# Patient Record
Sex: FEMALE | URBAN - METROPOLITAN AREA
[De-identification: names, ages, dates, MRNs, and addresses within clinical notes are randomized per-mention and may not be internally consistent; named-entity substitution may affect disease eponyms.]

---

## 2020-10-19 ENCOUNTER — OFFICE VISIT (OUTPATIENT)
Dept: URBAN - METROPOLITAN AREA CLINIC 86 | Facility: CLINIC | Age: 35
End: 2020-10-19
Payer: COMMERCIAL

## 2020-10-19 PROCEDURE — 99204 OFFICE O/P NEW MOD 45 MIN: CPT | Performed by: OPHTHALMOLOGY

## 2020-10-19 PROCEDURE — 92134 CPTRZ OPH DX IMG PST SGM RTA: CPT | Performed by: OPHTHALMOLOGY

## 2020-10-19 RX ORDER — AZITHROMYCIN MONOHYDRATE 250 MG/1
250 MG TABLET, FILM COATED ORAL
Qty: 30 | Refills: 0 | Status: INACTIVE
Start: 2020-10-19 | End: 2020-10-23

## 2020-10-19 ASSESSMENT — INTRAOCULAR PRESSURE
OS: 19
OD: 18

## 2020-10-19 NOTE — IMPRESSION/PLAN
Impression: Neuroretinitis OS
-started about 6 weeks ago. Pt noticed high fevers preceding vision loss. Was hospitalized and treated for presumed pneumonia with ABX. Pt seen by ophthalmologist after hospitalization. Diagnosed with disc edema. Was sent back to hospital to obtain IV steroid treatment. Currently she is not on any treatment. Vision has somewhat improved since onset.
-has pet cats
-? history of TB- untreated
-disc edema has resolved OCT:
OD: WNL
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 Given cat exposure, I am concerned for Bartonella related disease. Also on differential includes TB given remote history. Lyme, Syphilis, Sarcoid negative. Had MRI and LP. No evidence of MS per pt. Plan: Start Azithromycin 250mg q day Check Bartonella, TB

RTC 2 weeks- if TB negative will start oral Prednisone.

## 2020-11-02 ENCOUNTER — OFFICE VISIT (OUTPATIENT)
Dept: URBAN - METROPOLITAN AREA CLINIC 86 | Facility: CLINIC | Age: 35
End: 2020-11-02
Payer: COMMERCIAL

## 2020-11-02 PROCEDURE — 99213 OFFICE O/P EST LOW 20 MIN: CPT | Performed by: OPHTHALMOLOGY

## 2020-11-02 PROCEDURE — 92134 CPTRZ OPH DX IMG PST SGM RTA: CPT | Performed by: OPHTHALMOLOGY

## 2020-11-02 RX ORDER — PREDNISONE 20 MG/1
20 MG TABLET ORAL
Qty: 90 | Refills: 1 | Status: INACTIVE
Start: 2020-11-02 | End: 2020-12-01

## 2020-11-02 RX ORDER — AZITHROMYCIN MONOHYDRATE 250 MG/1
250 MG TABLET, FILM COATED ORAL
Qty: 30 | Refills: 1 | Status: INACTIVE
Start: 2020-11-02 | End: 2020-12-01

## 2020-11-02 ASSESSMENT — INTRAOCULAR PRESSURE
OS: 17
OD: 16

## 2020-11-02 NOTE — IMPRESSION/PLAN
Impression: Neuroretinitis OS
-started about 6 weeks ago. Pt noticed high fevers preceding vision loss. Was hospitalized and treated for presumed pneumonia with ABX. Pt seen by ophthalmologist after hospitalization. Diagnosed with disc edema. Was sent back to hospital to obtain IV steroid treatment. Currently she is not on any treatment. Vision has somewhat improved since onset.
-has pet cats
-? history of TB- untreated
-disc edema has resolved OCT:
OD: WNL
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 Given cat exposure, I am concerned for Bartonella related disease. Also on differential includes TB given remote history. Lyme, Syphilis, Sarcoid negative. Had MRI and LP. No evidence of MS per pt. Plan: Start Azithromycin 250mg q day Check Bartonella;TB was negative Start Prednisone 60mg q day RTC 2 weeks DFE OU OCT OU

## 2020-11-16 ENCOUNTER — OFFICE VISIT (OUTPATIENT)
Dept: URBAN - METROPOLITAN AREA CLINIC 86 | Facility: CLINIC | Age: 35
End: 2020-11-16
Payer: COMMERCIAL

## 2020-11-16 DIAGNOSIS — H30.92 UNSPECIFIED CHORIORETINAL INFLAMMATION, LEFT EYE: Primary | ICD-10-CM

## 2020-11-16 PROCEDURE — 92134 CPTRZ OPH DX IMG PST SGM RTA: CPT | Performed by: OPHTHALMOLOGY

## 2020-11-16 PROCEDURE — 92014 COMPRE OPH EXAM EST PT 1/>: CPT | Performed by: OPHTHALMOLOGY

## 2020-11-16 ASSESSMENT — INTRAOCULAR PRESSURE
OD: 16
OS: 16

## 2020-11-16 NOTE — IMPRESSION/PLAN
Impression: Neuroretinitis OS
-started about 6 weeks ago. Pt noticed high fevers preceding vision loss. Was hospitalized and treated for presumed pneumonia with ABX. Pt seen by ophthalmologist after hospitalization. Diagnosed with disc edema. Was sent back to hospital to obtain IV steroid treatment. Currently she is not on any treatment. Vision has somewhat improved since onset.
-has pet cats
-? history of TB- untreated
-disc edema has resolved OCT:
OD: WNL
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 Given cat exposure, I am concerned for Bartonella related disease. Also on differential includes TB given remote history. Lyme, Syphilis, Sarcoid negative. Had MRI and LP. No evidence of MS per pt. Plan: Continue Azithromycin 250mg q day Taper Prednisone 40mg x 5 days 20mg x 5 days 10mg x 5 days then stop Follow up Bartonella titers RTC 4 weeks DFE OU OCT OU

## 2020-12-14 ENCOUNTER — OFFICE VISIT (OUTPATIENT)
Dept: URBAN - METROPOLITAN AREA CLINIC 86 | Facility: CLINIC | Age: 35
End: 2020-12-14
Payer: COMMERCIAL

## 2020-12-14 PROCEDURE — 92134 CPTRZ OPH DX IMG PST SGM RTA: CPT | Performed by: OPHTHALMOLOGY

## 2020-12-14 PROCEDURE — 92014 COMPRE OPH EXAM EST PT 1/>: CPT | Performed by: OPHTHALMOLOGY

## 2020-12-14 ASSESSMENT — INTRAOCULAR PRESSURE
OS: 18
OD: 19

## 2020-12-14 NOTE — IMPRESSION/PLAN
Impression: Neuroretinitis OS Pt noticed high fevers preceding vision loss. Was hospitalized and treated for presumed pneumonia with ABX. Pt seen by ophthalmologist after hospitalization. Diagnosed with disc edema. Was sent back to hospital to obtain IV steroid treatment. Currently she is not on any treatment. Vision has somewhat improved since onset.
-has pet cats
-? history of TB- untreated
-disc edema has resolved OCT:
OD: WNL
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 Given cat exposure, I am concerned for Bartonella related disease. Also on differential includes TB given remote history. Lyme, Syphilis, Sarcoid negative. Had MRI and LP. No evidence of MS per pt. Plan: Pt has finished Pred taper and 1 month of Azithromycin. Will stop meds. Observation recommended at this point.  

RTC 3 months DFE OU OCT OU

## 2021-04-05 ENCOUNTER — OFFICE VISIT (OUTPATIENT)
Dept: URBAN - METROPOLITAN AREA CLINIC 86 | Facility: CLINIC | Age: 36
End: 2021-04-05
Payer: COMMERCIAL

## 2021-04-05 PROCEDURE — 99213 OFFICE O/P EST LOW 20 MIN: CPT | Performed by: OPHTHALMOLOGY

## 2021-04-05 PROCEDURE — 92134 CPTRZ OPH DX IMG PST SGM RTA: CPT | Performed by: OPHTHALMOLOGY

## 2021-04-05 ASSESSMENT — INTRAOCULAR PRESSURE
OS: 13
OD: 14

## 2021-04-05 NOTE — IMPRESSION/PLAN
Impression: Neuroretinitis OS Pt noticed high fevers preceding vision loss. Was hospitalized and treated for presumed pneumonia with ABX. Pt seen by ophthalmologist after hospitalization. Diagnosed with disc edema. Was sent back to hospital to obtain IV steroid treatment. Currently she is not on any treatment. Vision has somewhat improved since onset.
-has pet cats
-? history of TB- untreated
-disc edema has resolved OCT:
OD: WNL
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 Given cat exposure, I am concerned for Bartonella related disease. Also on differential includes TB given remote history. Lyme, Syphilis, Sarcoid negative. Had MRI and LP. No evidence of MS per pt. Plan: Stable vision. Off meds. No evidence of active disease. 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 RTC PRN.